# Patient Record
Sex: MALE | Race: WHITE | Employment: FULL TIME | URBAN - METROPOLITAN AREA
[De-identification: names, ages, dates, MRNs, and addresses within clinical notes are randomized per-mention and may not be internally consistent; named-entity substitution may affect disease eponyms.]

---

## 2019-07-24 ENCOUNTER — HOSPITAL ENCOUNTER (OUTPATIENT)
Age: 59
Discharge: HOME | End: 2019-07-24
Payer: COMMERCIAL

## 2019-07-24 LAB
EKG ATRIAL RATE: 82 BPM
EKG DIAGNOSIS: NORMAL
EKG P AXIS: 70 DEGREES
EKG P-R INTERVAL: 172 MS
EKG Q-T INTERVAL: 376 MS
EKG QRS DURATION: 84 MS
EKG QTC CALCULATION (BAZETT): 439 MS
EKG R AXIS: 59 DEGREES
EKG T AXIS: 56 DEGREES
EKG VENTRICULAR RATE: 82 BPM

## 2019-07-24 PROCEDURE — 93005 ELECTROCARDIOGRAM TRACING: CPT | Performed by: INTERNAL MEDICINE

## 2019-07-24 PROCEDURE — 93010 ELECTROCARDIOGRAM REPORT: CPT | Performed by: INTERNAL MEDICINE

## 2019-08-01 ENCOUNTER — HOSPITAL ENCOUNTER (OUTPATIENT)
Dept: PHYSICAL THERAPY | Age: 59
Setting detail: THERAPIES SERIES
Discharge: HOME | End: 2019-08-01
Payer: COMMERCIAL

## 2019-08-01 PROCEDURE — 97110 THERAPEUTIC EXERCISES: CPT

## 2019-08-01 PROCEDURE — 97016 VASOPNEUMATIC DEVICE THERAPY: CPT

## 2019-08-01 PROCEDURE — 97140 MANUAL THERAPY 1/> REGIONS: CPT

## 2019-08-01 PROCEDURE — 97161 PT EVAL LOW COMPLEX 20 MIN: CPT

## 2019-08-01 NOTE — PLAN OF CARE
factors: supine, activity avoidence   Provocative factors: walking, bending hip, standing    Type: [x]Constant   []Intermittent  []Radiating [x]Localized []other:     Numbness/Tingling: none    Occupation/School:  [employer]      Living Status/Prior Level of Function: Independent with ADLs and IADLs, work and hobbies    OBJECTIVE:     ROM LEFT RIGHT   HIP Flex 110 80   HIP Abd     HIP Ext     HIP IR -- --   HIP ER 30 --   Knee ext 0 (4)   Knee Flex 130 95   Ankle PF WNL WNL   Ankle DF 10 0   Ankle In     Ankle Ev     Strength  LEFT RIGHT   HIP Flexors 4- 3-   HIP Abductors 4- 3-   HIP Ext 4 3-   Hip ER     Knee EXT (quad) 4+ 4-   Knee Flex (HS) 4+ 4   Ankle DF 5 4+   Ankle PF     Ankle Inv     Ankle EV       Reflexes/Sensation:    [x]Dermatomes/Myotomes intact    [x]Reflexes equal and normal bilaterally   []Other:    Joint mobility: Guarding and pain, assess at a later time   []Normal    [x]Hypo   []Hyper    Palpation: global swelling throughout quad and lateral hip     Functional Mobility/Transfers: poor SLR with transfers, min A provided     Posture: hip flexed posture in standing with FWW, out toesing    Bandages/Dressings/Incisions: surgical dressing covering incision. Not removed today    Gait: (include devices/WB status) WW with step to gait pattern, WBAT    Orthopedic Special Tests: neg hommans                       [x] Patient history, allergies, meds reviewed. Medical chart reviewed. See intake form. Review Of Systems (ROS):  [x]Performed Review of systems (Integumentary, CardioPulmonary, Neurological) by intake and observation. Intake form has been scanned into medical record. Patient has been instructed to contact their primary care physician regarding ROS issues if not already being addressed at this time.        Co-morbidities/Complexities (which will affect course of rehabilitation):   []None           Arthritic conditions   []Rheumatoid arthritis (M05.9)  [x]Osteoarthritis (M19.91)   Cardiovascular conditions   []Hypertension (I10)  []Hyperlipidemia (E78.5)  []Angina pectoris (I20)  []Atherosclerosis (I70)   Musculoskeletal conditions   []Disc pathology   []Congenital spine pathologies   []Prior surgical intervention  []Osteoporosis (M81.8)  []Osteopenia (M85.8)   Endocrine conditions   []Hypothyroid (E03.9)  []Hyperthyroid Gastrointestinal conditions   []Constipation (C23.27)   Metabolic conditions   []Morbid obesity (E66.01)  []Diabetes type 1(E10.65) or 2 (E11.65)   []Neuropathy (G60.9)     Pulmonary conditions   []Asthma (J45)  []Coughing   []COPD (J44.9)   Psychological Disorders  []Anxiety (F41.9)  []Depression (F32.9)   []Other:   []Other:          Barriers to/and or personal factors that will affect rehab potential:              []Age  []Sex              []Motivation/Lack of Motivation                        []Co-Morbidities              []Cognitive Function, education/learning barriers              []Environmental, home barriers              []profession/work barriers  []past PT/medical experience  []other:  Justification:     Falls Risk Assessment (30 days):   [x] Falls Risk assessed and no intervention required.   [] Falls Risk assessed and Patient requires intervention due to being higher risk   TUG score (>12s at risk):     [] Falls education provided, including       G-Codes:   LEFS 85%    ASSESSMENT:  Functional Impairments:     [x]Noted lumbar/proximal hip/LE joint hypomobility   [x]Decreased LE functional ROM   [x]Decreased core/proximal hip strength and neuromuscular control   [x]Decreased LE functional strength   [x]Reduced balance/proprioceptive control   []other:      Functional Activity Limitations (from functional questionnaire and intake)   [x]Reduced ability to tolerate prolonged functional positions   [x]Reduced ability or difficulty with changes of positions or transfers between positions   [x]Reduced ability to maintain good posture and demonstrate good body

## 2019-08-13 ENCOUNTER — HOSPITAL ENCOUNTER (OUTPATIENT)
Dept: PHYSICAL THERAPY | Age: 59
Setting detail: THERAPIES SERIES
Discharge: HOME | End: 2019-08-13
Payer: COMMERCIAL

## 2019-08-13 PROCEDURE — 97110 THERAPEUTIC EXERCISES: CPT

## 2019-08-13 PROCEDURE — 97140 MANUAL THERAPY 1/> REGIONS: CPT

## 2019-08-13 NOTE — FLOWSHEET NOTE
progression  [] Other:     ASSESSMENT: Pt tolerated treatment well today. Improved tolerance for exercise and functional mobility today. Pt exhibits active trigger points throughout lateral hip and gluteal musculature. Progressed to standing exercise today. Incision healing well. Continued PT recommenced.         Treatment/Activity Tolerance:  [x] Patient tolerated treatment well [] Patient limited by fatique  [] Patient limited by pain  [] Patient limited by other medical complications  [] Other:     Prognosis: [x] Good [] Fair  [] Poor    Patient Requires Follow-up: [x] Yes  [] No    PLAN:  [x] Continue per plan of care [] Alter current plan (see comments)  [] Plan of care initiated [] Hold pending MD visit [] Discharge    Electronically signed by: Hodan Vail PT

## 2019-08-15 ENCOUNTER — HOSPITAL ENCOUNTER (OUTPATIENT)
Dept: PHYSICAL THERAPY | Age: 59
Setting detail: THERAPIES SERIES
Discharge: HOME | End: 2019-08-15
Payer: COMMERCIAL

## 2019-08-15 PROCEDURE — 97110 THERAPEUTIC EXERCISES: CPT

## 2019-08-15 PROCEDURE — 97140 MANUAL THERAPY 1/> REGIONS: CPT

## 2019-08-20 ENCOUNTER — HOSPITAL ENCOUNTER (OUTPATIENT)
Dept: PHYSICAL THERAPY | Age: 59
Setting detail: THERAPIES SERIES
Discharge: HOME | End: 2019-08-20
Payer: COMMERCIAL

## 2019-08-20 PROCEDURE — 97140 MANUAL THERAPY 1/> REGIONS: CPT

## 2019-08-20 PROCEDURE — 97110 THERAPEUTIC EXERCISES: CPT

## 2019-08-20 NOTE — FLOWSHEET NOTE
[facility]    Physical Therapy Daily Treatment Note  Date:  2019    Patient Name:  Brent Bernardo    :  1960  MRN: [id number]  Restrictions/Precautions:    Medical/Treatment Diagnosis Information:  · Diagnosis: OA resulting from R hip Dysplasia M16.31  · Treatment Diagnosis: Right Hip Pain U06.132  Insurance/Certification information:  PT Insurance Information: Xochitl  Physician Information:  Referring Practitioner: Maggie Freitas  Plan of care signed (Y/N):     Date of Patient follow up with Physician:     G-Code (if applicable):  LEFS 02%  Date G-Code Applied:  19       Progress Note: [x]  Yes  []  No  Next due by: Visit #10       Latex Allergy:  [x]NO      []YES   Preferred Language for Healthcare:   [x]English       []other:    Visit # Insurance Allowable   4 NM     Pain level:  4-5/10     SUBJECTIVE:  Pt states the hip was sore from sitting at dinner for his birthday yesterday. The hip is doing better today, but he was worried about the amount of soreness.      OBJECTIVE:   Observation:   Test measurements:      RESTRICTIONS/PRECAUTIONS: Lateral posterior hip percauations for 6 weeks    Exercises/Interventions:     Therapeutic Ex X26 min Resistance Sets/sec Reps Notes   Retro Stepper/BIKE       Supine clam Teal  band 2 10     bridge  1 15    3 way SLR  2 10    SAQ/LAQ    Hip ABD  2 10 standing   Hip Ext /table  1 15 Standing today   squat  1 20 Small squat at half wall    Bosu fwd/side lunge       Slide Lunge       Leg Press Ecc 0-       Cybex HS curl       TKE #30 3\" 20    Glute side walks none 3 10ft At side wall   BOSU squat       Step up/ecc step down 4\" 1 20 ue assist at half wall                 Manual Intervention X20 min       Hip mobs/PROM  8 min  PROM hip flex   Tib/Fem Mobs       Patella Mobs       Ankle mobs       STM/IASTM  12 min  Quad, lateral hip, posterior hip          NMR re-education       [place]ian/Biofeedback 10/10 spine soft tissue/joints for the purpose of modulating pain, promoting relaxation,  increasing ROM, reducing/eliminating soft tissue swelling/inflammation/restriction, improving soft tissue extensibility and allowing for proper ROM for normal function with self care, mobility, lifting and ambulation. Modalities:  15 min game ready on knee and cold pack at hip for swelling and pain    Charges:  Timed Code Treatment Minutes: 46   Total Treatment Minutes: 60     [] EVAL  [x] AP(30274) x  2   [] IONTO  [] NMR (14753) x      [x] VASO  [x] Manual (98934) x  1    [] Other:  [] TA x       [] Mech Traction (58949)  [] ES(attended) (59946)      [] ES (un) (66888):     GOALS:   Patient stated goal: return to work and daily activities      Therapist goals for Patient:   Short Term Goals: To be achieved in: 2 weeks  1. Independent in HEP and progression per patient tolerance, in order to prevent re-injury. 2. Patient will have a decrease in pain to facilitate improvement in movement, function, and ADLs as indicated by Functional Deficits.     Long Term Goals: To be achieved in: 10 weeks  1. Disability index score of 15% or less for the LEFS to assist with reaching prior level of function. 2. Patient will demonstrate increased AROM to WNL to allow for proper joint functioning as indicated by patients Functional Deficits. 3. Patient will demonstrate an increase in Strength to good proximal hip strength and control, within 5lb HHD in LE to allow for proper functional mobility as indicated by patients Functional Deficits. 4. Patient will return to prior functional activities without increased symptoms or restriction. 5. Pt will report ability to walk and stand for 6-8 hr shift at [facility] with pain no greater than 2/10/           Progression Towards Functional goals:  [x] Patient is progressing as expected towards functional goals listed. [] Progression is slowed due to complexities listed.   [] Progression has been

## 2019-08-22 ENCOUNTER — HOSPITAL ENCOUNTER (OUTPATIENT)
Dept: PHYSICAL THERAPY | Age: 59
Setting detail: THERAPIES SERIES
Discharge: HOME | End: 2019-08-22
Payer: COMMERCIAL

## 2019-08-22 PROCEDURE — 97140 MANUAL THERAPY 1/> REGIONS: CPT

## 2019-08-22 PROCEDURE — 97110 THERAPEUTIC EXERCISES: CPT

## 2019-08-22 NOTE — FLOWSHEET NOTE
Post.)  [x] (34706) Provided manual therapy to mobilize LE, proximal hip and/or LS spine soft tissue/joints for the purpose of modulating pain, promoting relaxation,  increasing ROM, reducing/eliminating soft tissue swelling/inflammation/restriction, improving soft tissue extensibility and allowing for proper ROM for normal function with self care, mobility, lifting and ambulation. Modalities:  15 min game ready on knee and cold pack at hip for swelling and pain    Charges:  Timed Code Treatment Minutes: 44   Total Treatment Minutes: 60     [] EVAL  [x] FI(40575) x  2   [] IONTO  [] NMR (68256) x      [x] VASO  [x] Manual (90564) x  1    [] Other:  [] TA x       [] Mech Traction (87691)  [] ES(attended) (21217)      [] ES (un) (33024):     GOALS:   Patient stated goal: return to work and daily activities      Therapist goals for Patient:   Short Term Goals: To be achieved in: 2 weeks  1. Independent in HEP and progression per patient tolerance, in order to prevent re-injury. 2. Patient will have a decrease in pain to facilitate improvement in movement, function, and ADLs as indicated by Functional Deficits.     Long Term Goals: To be achieved in: 10 weeks  1. Disability index score of 15% or less for the LEFS to assist with reaching prior level of function. 2. Patient will demonstrate increased AROM to WNL to allow for proper joint functioning as indicated by patients Functional Deficits. 3. Patient will demonstrate an increase in Strength to good proximal hip strength and control, within 5lb HHD in LE to allow for proper functional mobility as indicated by patients Functional Deficits. 4. Patient will return to prior functional activities without increased symptoms or restriction. 5. Pt will report ability to walk and stand for 6-8 hr shift at [facility] with pain no greater than 2/10/           Progression Towards Functional goals:  [x] Patient is progressing as expected towards functional goals listed.

## 2019-08-27 ENCOUNTER — HOSPITAL ENCOUNTER (OUTPATIENT)
Dept: PHYSICAL THERAPY | Age: 59
Setting detail: THERAPIES SERIES
Discharge: HOME | End: 2019-08-27
Payer: COMMERCIAL

## 2019-08-27 PROCEDURE — 97140 MANUAL THERAPY 1/> REGIONS: CPT

## 2019-08-27 PROCEDURE — 97110 THERAPEUTIC EXERCISES: CPT

## 2019-08-27 NOTE — FLOWSHEET NOTE
[facility]    Physical Therapy Daily Treatment Note  Date:  2019    Patient Name:  Juno Rivera    :  1960  MRN: [id number]  Restrictions/Precautions:    Medical/Treatment Diagnosis Information:  · Diagnosis: OA resulting from R hip Dysplasia M16.31  · Treatment Diagnosis: Right Hip Pain Q45.361  Insurance/Certification information:  PT Insurance Information: Xochitl  Physician Information:  Referring Practitioner: Mary Wilkerson  Plan of care signed (Y/N):     Date of Patient follow up with Physician:     G-Code (if applicable):  LEFS 85%  Date G-Code Applied:  19       Progress Note: [x]  Yes  []  No  Next due by: Visit #10       Latex Allergy:  [x]NO      []YES   Preferred Language for Healthcare:   [x]English       []other:    Visit # Insurance Allowable   5 NM     Pain level:  4-5/10     SUBJECTIVE:  Pt states the hip is doing okay. He is ready for these precautions to be lifted. Pt is frustrated by it hip flexion being so limiting with cleaning and adls.      OBJECTIVE:   Observation:   Test measurements:      RESTRICTIONS/PRECAUTIONS: Lateral posterior hip percauations for 6 weeks    Exercises/Interventions:     Therapeutic Ex X27 min Resistance Sets/sec Reps Notes   Retro Stepper/BIKE       Supine clam Blue band 2 10     bridge  1 15    3 way SLR  2 10    LAQ Noble band shins  3 10    Hip ABD  2 10 standing   Hip Ext /table  1 15 Standing today   squat  1 20 Small squat at half wall    Bosu fwd/side lunge  5-10\" 10 Small range fwd lunge   Slide Lunge       Leg Press Ecc 0-       Cybex HS curl       TKE #35 3\" 20    Glute side walks Noble on thighs 4 10ft At side wall   BOSU squat       Step up/ecc step down 6\" 1 20 ue assist at half wall   Marching  1 20 total At half wall          Manual Intervention X26 min       Hip mobs/PROM  12 min  PROM hip flex   Tib/Fem Mobs       Patella Mobs       Ankle mobs       STM/IASTM  9 min  Quad, lateral hip, posterior hip   Scar massage  5 min     NMR re-education       Cymro/Biofeedback 10/10       G. Med activaiton/sidelying       G. Max Activation/prone       Hip Ext full ROM G. Activation       Bosu Bal and Prop- G Med       Single leg stance/Balance/Prop       Bosu Retro G. Med act                         Therapeutic Exercise and NMR EXR  [x] (59046) Provided verbal/tactile cueing for activities related to strengthening, flexibility, endurance, ROM for improvements in LE, proximal hip, and core control with self care, mobility, lifting, ambulation.  [] (40502) Provided verbal/tactile cueing for activities related to improving balance, coordination, kinesthetic sense, posture, motor skill, proprioception  to assist with LE, proximal hip, and core control in self care, mobility, lifting, ambulation and eccentric single leg control.      NMR and Therapeutic Activities:    [] (89087 or 60038) Provided verbal/tactile cueing for activities related to improving balance, coordination, kinesthetic sense, posture, motor skill, proprioception and motor activation to allow for proper function of core, proximal hip and LE with self care and ADLs  [] (15243) Gait Re-education- Provided training and instruction to the patient for proper LE, core and proximal hip recruitment and positioning and eccentric body weight control with ambulation re-education including up and down stairs     Home Exercise Program:    [x] (42260) Reviewed/Progressed HEP activities related to strengthening, flexibility, endurance, ROM of core, proximal hip and LE for functional self-care, mobility, lifting and ambulation/stair navigation   [] (00454)Reviewed/Progressed HEP activities related to improving balance, coordination, kinesthetic sense, posture, motor skill, proprioception of core, proximal hip and LE for self care, mobility, lifting, and ambulation/stair navigation      Manual Treatments:  PROM / STM / Oscillations-Mobs:  G-I, II,

## 2019-08-29 ENCOUNTER — HOSPITAL ENCOUNTER (OUTPATIENT)
Dept: PHYSICAL THERAPY | Age: 59
Setting detail: THERAPIES SERIES
Discharge: HOME | End: 2019-08-29
Payer: COMMERCIAL

## 2019-08-29 PROCEDURE — 97140 MANUAL THERAPY 1/> REGIONS: CPT

## 2019-08-29 PROCEDURE — 97110 THERAPEUTIC EXERCISES: CPT

## 2019-08-29 NOTE — FLOWSHEET NOTE
Functional goals:  [x] Patient is progressing as expected towards functional goals listed. [] Progression is slowed due to complexities listed. [] Progression has been slowed due to co-morbidities. [] Plan just implemented, too soon to assess goals progression  [] Other:     ASSESSMENT: Pt tolerated treatment well today. Introduction of leg press today and lateral step ups. Faster onset of fatigue as a result today. Active trigger points and soft tissue tightness throughout the posterior hip today. addressed with manual therapy. Continued PT recommenced.         Treatment/Activity Tolerance:  [x] Patient tolerated treatment well [] Patient limited by fatique  [] Patient limited by pain  [] Patient limited by other medical complications  [] Other:     Prognosis: [x] Good [] Fair  [] Poor    Patient Requires Follow-up: [x] Yes  [] No    PLAN:  [x] Continue per plan of care [] Alter current plan (see comments)  [] Plan of care initiated [] Hold pending MD visit [] Discharge    Electronically signed by: Hodan Vail PT

## 2019-09-03 ENCOUNTER — HOSPITAL ENCOUNTER (OUTPATIENT)
Dept: PHYSICAL THERAPY | Age: 59
Setting detail: THERAPIES SERIES
Discharge: HOME | End: 2019-09-03
Payer: COMMERCIAL

## 2019-09-03 PROCEDURE — 97140 MANUAL THERAPY 1/> REGIONS: CPT

## 2019-09-03 PROCEDURE — 97110 THERAPEUTIC EXERCISES: CPT

## 2019-09-03 NOTE — PLAN OF CARE
flex   Tib/Fem Mobs       Patella Mobs       Ankle mobs       STM/IASTM  12 min  Quad, lateral hip, posterior hip   Scar massage  6 min     NMR re-education       [place]gent[place]/Biofeedback 10/10       G. Med activaiton/sidelying       G. Max Activation/prone       Hip Ext full ROM G. Activation       Bosu Bal and Prop- G Med       Single leg stance/Balance/Prop       Bosu Retro G. Med act                         Therapeutic Exercise and NMR EXR  [x] (48772) Provided verbal/tactile cueing for activities related to strengthening, flexibility, endurance, ROM for improvements in LE, proximal hip, and core control with self care, mobility, lifting, ambulation.  [] (55223) Provided verbal/tactile cueing for activities related to improving balance, coordination, kinesthetic sense, posture, motor skill, proprioception  to assist with LE, proximal hip, and core control in self care, mobility, lifting, ambulation and eccentric single leg control.      NMR and Therapeutic Activities:    [] (69448 or 58082) Provided verbal/tactile cueing for activities related to improving balance, coordination, kinesthetic sense, posture, motor skill, proprioception and motor activation to allow for proper function of core, proximal hip and LE with self care and ADLs  [] (35773) Gait Re-education- Provided training and instruction to the patient for proper LE, core and proximal hip recruitment and positioning and eccentric body weight control with ambulation re-education including up and down stairs     Home Exercise Program:    [x] (01066) Reviewed/Progressed HEP activities related to strengthening, flexibility, endurance, ROM of core, proximal hip and LE for functional self-care, mobility, lifting and ambulation/stair navigation   [] (55969)Reviewed/Progressed HEP activities related to improving balance, coordination, kinesthetic sense, posture, motor skill, proprioception of core, proximal hip and LE for self care, mobility, lifting, and ambulation/stair navigation      Manual Treatments:  PROM / STM / Oscillations-Mobs:  G-I, II, III, IV (PA's, Inf., Post.)  [x] (78994) Provided manual therapy to mobilize LE, proximal hip and/or LS spine soft tissue/joints for the purpose of modulating pain, promoting relaxation,  increasing ROM, reducing/eliminating soft tissue swelling/inflammation/restriction, improving soft tissue extensibility and allowing for proper ROM for normal function with self care, mobility, lifting and ambulation. Modalities:  10 min game ready on knee and cold pack at hip for pain    Charges:  Timed Code Treatment Minutes: 60   Total Treatment Minutes: 70     [] EVAL  [x] JK(87869) x  2   [] IONTO  [] NMR (44200) x      [] VASO  [x] Manual (95855) x  2    [] Other:  [] TA x       [] Mech Traction (05338)  [] ES(attended) (67603)      [] ES (un) (69018):     GOALS:   Patient stated goal: return to work and daily activities      Therapist goals for Patient:   Short Term Goals: To be achieved in: 2 weeks  1. Independent in HEP and progression per patient tolerance, in order to prevent re-injury. 2. Patient will have a decrease in pain to facilitate improvement in movement, function, and ADLs as indicated by Functional Deficits.     Long Term Goals: To be achieved in: 10 weeks  1. Disability index score of 15% or less for the LEFS to assist with reaching prior level of function. 2. Patient will demonstrate increased AROM to WNL to allow for proper joint functioning as indicated by patients Functional Deficits. 3. Patient will demonstrate an increase in Strength to good proximal hip strength and control, within 5lb HHD in LE to allow for proper functional mobility as indicated by patients Functional Deficits. 4. Patient will return to prior functional activities without increased symptoms or restriction.    5. Pt will report ability to walk and stand for 6-8 hr shift at [facility] with pain no greater than 2/10/

## 2019-09-05 ENCOUNTER — APPOINTMENT (OUTPATIENT)
Dept: PHYSICAL THERAPY | Age: 59
End: 2019-09-05
Payer: COMMERCIAL

## 2019-09-10 ENCOUNTER — HOSPITAL ENCOUNTER (OUTPATIENT)
Dept: PHYSICAL THERAPY | Age: 59
Setting detail: THERAPIES SERIES
Discharge: HOME | End: 2019-09-10
Payer: COMMERCIAL

## 2019-09-10 PROCEDURE — 97110 THERAPEUTIC EXERCISES: CPT

## 2019-09-10 PROCEDURE — 97140 MANUAL THERAPY 1/> REGIONS: CPT

## 2019-09-10 NOTE — FLOWSHEET NOTE
STM/IASTM  6 min  Quad, lateral hip, posterior hip   Scar massage  6 min     NMR re-education       Ukrainian/Biofeedback 10/10       G. Med activaiton/sidelying       G. Max Activation/prone       Hip Ext full ROM G. Activation       Bosu Bal and Prop- G Med       Single leg stance/Balance/Prop       Bosu Retro G. Med act                         Therapeutic Exercise and NMR EXR  [x] (13471) Provided verbal/tactile cueing for activities related to strengthening, flexibility, endurance, ROM for improvements in LE, proximal hip, and core control with self care, mobility, lifting, ambulation.  [] (01561) Provided verbal/tactile cueing for activities related to improving balance, coordination, kinesthetic sense, posture, motor skill, proprioception  to assist with LE, proximal hip, and core control in self care, mobility, lifting, ambulation and eccentric single leg control.      NMR and Therapeutic Activities:    [] (91165 or 06879) Provided verbal/tactile cueing for activities related to improving balance, coordination, kinesthetic sense, posture, motor skill, proprioception and motor activation to allow for proper function of core, proximal hip and LE with self care and ADLs  [] (51160) Gait Re-education- Provided training and instruction to the patient for proper LE, core and proximal hip recruitment and positioning and eccentric body weight control with ambulation re-education including up and down stairs     Home Exercise Program:    [x] (85671) Reviewed/Progressed HEP activities related to strengthening, flexibility, endurance, ROM of core, proximal hip and LE for functional self-care, mobility, lifting and ambulation/stair navigation   [] (84589)Reviewed/Progressed HEP activities related to improving balance, coordination, kinesthetic sense, posture, motor skill, proprioception of core, proximal hip and LE for self care, mobility, lifting, and ambulation/stair navigation      Manual Treatments:  PROM / STM / Oscillations-Mobs:  G-I, II, III, IV (PA's, Inf., Post.)  [x] (20035) Provided manual therapy to mobilize LE, proximal hip and/or LS spine soft tissue/joints for the purpose of modulating pain, promoting relaxation,  increasing ROM, reducing/eliminating soft tissue swelling/inflammation/restriction, improving soft tissue extensibility and allowing for proper ROM for normal function with self care, mobility, lifting and ambulation. Modalities:  10 min game ready on knee and cold pack at hip for pain    Charges:  Timed Code Treatment Minutes: 49   Total Treatment Minutes: 60     [] EVAL  [x] MG(38766) x  2   [] IONTO  [] NMR (65910) x      [] VASO  [x] Manual (66308) x  2    [] Other:  [] TA x       [] Mech Traction (78817)  [] ES(attended) (63472)      [] ES (un) (25949):     GOALS:   Patient stated goal: return to work and daily activities      Therapist goals for Patient:   Short Term Goals: To be achieved in: 2 weeks  1. Independent in HEP and progression per patient tolerance, in order to prevent re-injury. 2. Patient will have a decrease in pain to facilitate improvement in movement, function, and ADLs as indicated by Functional Deficits.     Long Term Goals: To be achieved in: 10 weeks  1. Disability index score of 15% or less for the LEFS to assist with reaching prior level of function. 2. Patient will demonstrate increased AROM to WNL to allow for proper joint functioning as indicated by patients Functional Deficits. 3. Patient will demonstrate an increase in Strength to good proximal hip strength and control, within 5lb HHD in LE to allow for proper functional mobility as indicated by patients Functional Deficits. 4. Patient will return to prior functional activities without increased symptoms or restriction.    5. Pt will report ability to walk and stand for 6-8 hr shift at [facility] with pain no greater than 2/10/           Progression Towards Functional goals:  [x] Patient is progressing as

## 2019-09-12 ENCOUNTER — HOSPITAL ENCOUNTER (OUTPATIENT)
Dept: PHYSICAL THERAPY | Age: 59
Setting detail: THERAPIES SERIES
Discharge: HOME | End: 2019-09-12
Payer: COMMERCIAL

## 2019-09-12 PROCEDURE — 97140 MANUAL THERAPY 1/> REGIONS: CPT

## 2019-09-12 PROCEDURE — 97110 THERAPEUTIC EXERCISES: CPT

## 2019-09-12 NOTE — FLOWSHEET NOTE
[facility]          Physical Therapy Daily Treatment Note  Date:  2019    Patient Name:  Yasmany Montes    :  1960  MRN: [id number]  Restrictions/Precautions:    Medical/Treatment Diagnosis Information:  · Diagnosis: OA resulting from R hip Dysplasia M16.31  · Treatment Diagnosis: Right Hip Pain K64.868  Insurance/Certification information:  PT Insurance Information: [employer]  Physician Information:  Referring Practitioner: Ross Meek  Plan of care signed (Y/N):     Date of Patient follow up with Physician:     G-Code (if applicable):  LEFS 56%  Date G-Code Applied:  19       Progress Note: [x]  Yes  []  No  Next due by: Visit #20       Latex Allergy:  [x]NO      []YES   Preferred Language for Healthcare:   [x]English       []other:    Visit # Insurance Allowable   10 NM     Pain level:  4-5/10     SUBJECTIVE:  Pt states the hip os doing okay. He is slowly returning to the doing chores around the house he was no able to do before.    OBJECTIVE:   Observation:   Test measurements:       RESTRICTIONS/PRECAUTIONS: Lateral posterior hip percauations for 6 weeks    Exercises/Interventions:     Therapeutic Ex X26 min Resistance Sets/sec Reps Notes   Retro Stepper/BIKE       Supine clam    bridge    3 way SLR Standing hip abd    LAQ    ER stretch c strap in sitting  10\"10   Hip Ext /table  Standing today   squat  c trx assist and chair +2 air-ex   Bosu fwd/side lunge  Small range fwd lunge   Slide Lunge       Leg Press Ecc 0- #100 1 25    Cybex hip abduction #40 1 20B    TKE    Glute side walks [place]  5 10ft    BOSU squat       Lateral step up    Step up/ecc step down 6\" 1 20 ue assist at half wall   Marching gastroc flexibility   30\" 3B  on slant board    Manual Intervention X17 min       Hip mobs/PROM  5 min  PROM hip flex   Tib/Fem Mobs       Patella Mobs       Ankle mobs       STM/IASTM  6 min  Quad, lateral hip, posterior hip   Scar massage

## 2019-09-17 ENCOUNTER — HOSPITAL ENCOUNTER (OUTPATIENT)
Dept: PHYSICAL THERAPY | Age: 59
Setting detail: THERAPIES SERIES
Discharge: HOME | End: 2019-09-17
Payer: COMMERCIAL

## 2019-09-17 PROCEDURE — 97110 THERAPEUTIC EXERCISES: CPT

## 2019-09-17 PROCEDURE — 97140 MANUAL THERAPY 1/> REGIONS: CPT

## 2019-09-17 NOTE — FLOWSHEET NOTE
mobility, lifting, and ambulation/stair navigation      Manual Treatments:  PROM / STM / Oscillations-Mobs:  G-I, II, III, IV (PA's, Inf., Post.)  [x] (21736) Provided manual therapy to mobilize LE, proximal hip and/or LS spine soft tissue/joints for the purpose of modulating pain, promoting relaxation,  increasing ROM, reducing/eliminating soft tissue swelling/inflammation/restriction, improving soft tissue extensibility and allowing for proper ROM for normal function with self care, mobility, lifting and ambulation. Modalities:  10 min game ready on knee and cold pack at hip for pain    Charges:  Timed Code Treatment Minutes: 52   Total Treatment Minutes: 60     [] EVAL  [x] IS(99455) x  2   [] IONTO  [] NMR (27622) x      [] VASO  [x] Manual (13727) x  2    [] Other:  [] TA x       [] Mech Traction (62947)  [] ES(attended) (34039)      [] ES (un) (66954):     GOALS:   Patient stated goal: return to work and daily activities      Therapist goals for Patient:   Short Term Goals: To be achieved in: 2 weeks  1. Independent in HEP and progression per patient tolerance, in order to prevent re-injury. 2. Patient will have a decrease in pain to facilitate improvement in movement, function, and ADLs as indicated by Functional Deficits.     Long Term Goals: To be achieved in: 10 weeks  1. Disability index score of 15% or less for the LEFS to assist with reaching prior level of function. 2. Patient will demonstrate increased AROM to WNL to allow for proper joint functioning as indicated by patients Functional Deficits. 3. Patient will demonstrate an increase in Strength to good proximal hip strength and control, within 5lb HHD in LE to allow for proper functional mobility as indicated by patients Functional Deficits. 4. Patient will return to prior functional activities without increased symptoms or restriction.    5. Pt will report ability to walk and stand for 6-8 hr shift at [facility] with pain no greater than 2/10/           Progression Towards Functional goals:  [x] Patient is progressing as expected towards functional goals listed. [] Progression is slowed due to complexities listed. [] Progression has been slowed due to co-morbidities. [] Plan just implemented, too soon to assess goals progression  [] Other:     ASSESSMENT: Pt is progressing well with LE strengthening. Increased emphasis with SLS on surgical leg with cuing for hip/glut activation. Hip hinging mechanics with squats is improving. Piriformis stretching performed today with available range to decrease posterior hip tightness. Pt would benefit from continued PT at this time.      Treatment/Activity Tolerance:  [x] Patient tolerated treatment well [] Patient limited by fatique  [] Patient limited by pain  [] Patient limited by other medical complications  [] Other:     Prognosis: [x] Good [] Fair  [] Poor    Patient Requires Follow-up: [x] Yes  [] No    PLAN:  [x] Continue per plan of care [] Alter current plan (see comments)  [] Plan of care initiated [] Hold pending MD visit [] Discharge    Electronically signed by: Eli Mathew PT

## 2019-09-19 ENCOUNTER — HOSPITAL ENCOUNTER (OUTPATIENT)
Dept: PHYSICAL THERAPY | Age: 59
Setting detail: THERAPIES SERIES
Discharge: HOME | End: 2019-09-19
Payer: COMMERCIAL

## 2019-09-19 PROCEDURE — 97140 MANUAL THERAPY 1/> REGIONS: CPT

## 2019-09-19 PROCEDURE — 97110 THERAPEUTIC EXERCISES: CPT

## 2019-09-19 NOTE — FLOWSHEET NOTE
pain no greater than 2/10/           Progression Towards Functional goals:  [x] Patient is progressing as expected towards functional goals listed. [] Progression is slowed due to complexities listed. [] Progression has been slowed due to co-morbidities. [] Plan just implemented, too soon to assess goals progression  [] Other:     ASSESSMENT: Pt is progressing well with LE strengthening. Hypomobility noted throughout the lateral knee and ankle complex, greatest at the fibular head and active trigger points throughout lateral calf. All addressed with manual therapy today. Pt continues to require cuing for glut activation due to poor activation, especially with single leg activity. Continued PT recommenced.    Treatment/Activity Tolerance:  [x] Patient tolerated treatment well [] Patient limited by fatique  [] Patient limited by pain  [] Patient limited by other medical complications  [] Other:     Prognosis: [x] Good [] Fair  [] Poor    Patient Requires Follow-up: [x] Yes  [] No    PLAN:  [x] Continue per plan of care [] Alter current plan (see comments)  [] Plan of care initiated [] Hold pending MD visit [] Discharge    Electronically signed by: Maggie Mohr PT

## 2019-09-24 ENCOUNTER — HOSPITAL ENCOUNTER (OUTPATIENT)
Dept: PHYSICAL THERAPY | Age: 59
Setting detail: THERAPIES SERIES
Discharge: HOME | End: 2019-09-24
Payer: COMMERCIAL

## 2019-09-24 PROCEDURE — 97140 MANUAL THERAPY 1/> REGIONS: CPT

## 2019-09-24 PROCEDURE — 97016 VASOPNEUMATIC DEVICE THERAPY: CPT

## 2019-09-24 PROCEDURE — 97110 THERAPEUTIC EXERCISES: CPT

## 2019-09-24 NOTE — FLOWSHEET NOTE
[facility]          [facility] Daily Treatment Note  Date:  2019    Patient Name:  Asuncion Buerger    :  1960  MRN: [id number]  Restrictions/Precautions:    Medical/Treatment Diagnosis Information:  · Diagnosis: OA resulting from R hip Dysplasia M16.31  · Treatment Diagnosis: Right Hip Pain P90.635  Insurance/Certification information:  PT Insurance Information: [place]  Physician Information:  Referring Practitioner: Cole Chowdhury  Plan of care signed (Y/N):     Date of Patient follow up with Physician:     G-Code (if applicable):  LEFS 65%  Date G-Code Applied:  19       Progress Note: [x]  Yes  []  No  Next due by: Visit #18      Latex Allergy:  [x]NO      []YES   Preferred Language for Healthcare:   [x]English       []other:    Visit # Insurance Allowable   14 NM     Pain level: 2-3/10     SUBJECTIVE: Pt reports increased ambulation over the weekend at the grocery store. Pt states he was shopping for about 45-60 mins and was sore throughout R LE after activity that resolved within 36 hrs. Pt was able to clip toenails for the first time since sx secondary to improved ROM in R hip .    OBJECTIVE:   Observation:   Test measurements:       RESTRICTIONS/PRECAUTIONS: Lateral posterior hip percauations for 6 weeks    Exercises/Interventions:     Therapeutic Ex X30 min Resistance Sets/sec Reps Notes   Retro Stepper/BIKE 5 min      Supine clam    bridge    3 way SLR Standing hip abd    LAQ    ER stretch c strap in sitting     Hip Ext /table  Standing today   squat  1 20 c trx assist and chair +2 air-ex cuing for R LE wb   Bosu fwd/side lunge  Small range fwd lunge   Slide Lunge       Leg Press Ecc 0- #110 2 15    Cybex hip abduction #40 1 20B    TKE    Glute side walks [place] 5 10ft    BOSU squat       Lateral step up    Step up/ecc step down 6\" 1 20 Cuing for glut and quad isolation    Marching piriformis stretching      gastroc flexibility

## 2019-09-26 ENCOUNTER — HOSPITAL ENCOUNTER (OUTPATIENT)
Dept: PHYSICAL THERAPY | Age: 59
Setting detail: THERAPIES SERIES
Discharge: HOME | End: 2019-09-26
Payer: COMMERCIAL

## 2019-09-26 PROCEDURE — 97140 MANUAL THERAPY 1/> REGIONS: CPT

## 2019-09-26 PROCEDURE — 97110 THERAPEUTIC EXERCISES: CPT

## 2019-10-01 ENCOUNTER — HOSPITAL ENCOUNTER (OUTPATIENT)
Dept: PHYSICAL THERAPY | Age: 59
Setting detail: THERAPIES SERIES
Discharge: HOME | End: 2019-10-01
Payer: COMMERCIAL

## 2019-10-01 PROCEDURE — 97110 THERAPEUTIC EXERCISES: CPT

## 2019-10-01 PROCEDURE — 97140 MANUAL THERAPY 1/> REGIONS: CPT

## 2019-10-03 ENCOUNTER — HOSPITAL ENCOUNTER (OUTPATIENT)
Dept: PHYSICAL THERAPY | Age: 59
Setting detail: THERAPIES SERIES
Discharge: HOME | End: 2019-10-03
Payer: COMMERCIAL

## 2019-10-03 PROCEDURE — 97110 THERAPEUTIC EXERCISES: CPT

## 2019-10-03 PROCEDURE — 97140 MANUAL THERAPY 1/> REGIONS: CPT

## 2019-10-08 ENCOUNTER — HOSPITAL ENCOUNTER (OUTPATIENT)
Dept: PHYSICAL THERAPY | Age: 59
Setting detail: THERAPIES SERIES
Discharge: HOME | End: 2019-10-08
Payer: COMMERCIAL

## 2019-10-08 PROCEDURE — 97110 THERAPEUTIC EXERCISES: CPT

## 2019-10-08 PROCEDURE — 97140 MANUAL THERAPY 1/> REGIONS: CPT

## 2019-10-10 ENCOUNTER — HOSPITAL ENCOUNTER (OUTPATIENT)
Dept: PHYSICAL THERAPY | Age: 59
Setting detail: THERAPIES SERIES
Discharge: HOME | End: 2019-10-10
Payer: COMMERCIAL

## 2019-10-10 PROCEDURE — 97140 MANUAL THERAPY 1/> REGIONS: CPT

## 2019-10-10 PROCEDURE — 97110 THERAPEUTIC EXERCISES: CPT

## 2019-10-15 ENCOUNTER — HOSPITAL ENCOUNTER (OUTPATIENT)
Dept: PHYSICAL THERAPY | Age: 59
Setting detail: THERAPIES SERIES
Discharge: HOME | End: 2019-10-15
Payer: COMMERCIAL

## 2019-10-17 ENCOUNTER — HOSPITAL ENCOUNTER (OUTPATIENT)
Dept: PHYSICAL THERAPY | Age: 59
Setting detail: THERAPIES SERIES
Discharge: HOME | End: 2019-10-17
Payer: COMMERCIAL

## 2019-10-17 PROCEDURE — 97110 THERAPEUTIC EXERCISES: CPT

## 2019-10-17 PROCEDURE — 97140 MANUAL THERAPY 1/> REGIONS: CPT

## 2019-10-22 ENCOUNTER — HOSPITAL ENCOUNTER (OUTPATIENT)
Dept: PHYSICAL THERAPY | Age: 59
Setting detail: THERAPIES SERIES
Discharge: HOME | End: 2019-10-22
Payer: COMMERCIAL

## 2019-10-22 PROCEDURE — 97140 MANUAL THERAPY 1/> REGIONS: CPT

## 2019-10-22 PROCEDURE — 97110 THERAPEUTIC EXERCISES: CPT

## 2019-10-24 ENCOUNTER — HOSPITAL ENCOUNTER (OUTPATIENT)
Dept: PHYSICAL THERAPY | Age: 59
Setting detail: THERAPIES SERIES
Discharge: HOME | End: 2019-10-24
Payer: COMMERCIAL